# Patient Record
Sex: FEMALE | Race: WHITE | NOT HISPANIC OR LATINO | Employment: UNEMPLOYED | URBAN - NONMETROPOLITAN AREA
[De-identification: names, ages, dates, MRNs, and addresses within clinical notes are randomized per-mention and may not be internally consistent; named-entity substitution may affect disease eponyms.]

---

## 2020-07-13 ENCOUNTER — TELEPHONE (OUTPATIENT)
Dept: ONCOLOGY | Facility: CLINIC | Age: 35
End: 2020-07-13

## 2020-07-13 NOTE — TELEPHONE ENCOUNTER
It does not look like this patient is in our system.  Thus it does not look like she is our patient.  Please inform him.  Thank you

## 2020-07-13 NOTE — TELEPHONE ENCOUNTER
[place] from Dr Chavez, [place] [facility] [place] Finch regarding patient Alee Eaton, he would like to discuss patient case.   Phone: [phone]    Prelim: Malignant Unknown Primary

## 2020-08-18 ENCOUNTER — APPOINTMENT (OUTPATIENT)
Dept: LAB | Facility: HOSPITAL | Age: 35
End: 2020-08-18